# Patient Record
Sex: MALE | Race: WHITE | HISPANIC OR LATINO | URBAN - METROPOLITAN AREA
[De-identification: names, ages, dates, MRNs, and addresses within clinical notes are randomized per-mention and may not be internally consistent; named-entity substitution may affect disease eponyms.]

---

## 2018-11-25 ENCOUNTER — EMERGENCY (EMERGENCY)
Facility: HOSPITAL | Age: 53
LOS: 1 days | Discharge: HOME | End: 2018-11-25
Attending: EMERGENCY MEDICINE | Admitting: EMERGENCY MEDICINE
Payer: MEDICAID

## 2018-11-25 ENCOUNTER — TRANSCRIPTION ENCOUNTER (OUTPATIENT)
Age: 53
End: 2018-11-25

## 2018-11-25 VITALS
RESPIRATION RATE: 18 BRPM | TEMPERATURE: 99 F | HEART RATE: 90 BPM | OXYGEN SATURATION: 100 % | DIASTOLIC BLOOD PRESSURE: 95 MMHG | SYSTOLIC BLOOD PRESSURE: 156 MMHG

## 2018-11-25 VITALS
OXYGEN SATURATION: 96 % | RESPIRATION RATE: 16 BRPM | HEART RATE: 109 BPM | DIASTOLIC BLOOD PRESSURE: 81 MMHG | SYSTOLIC BLOOD PRESSURE: 151 MMHG

## 2018-11-25 PROCEDURE — 99283 EMERGENCY DEPT VISIT LOW MDM: CPT

## 2018-11-25 PROCEDURE — 71046 X-RAY EXAM CHEST 2 VIEWS: CPT | Mod: 26

## 2018-11-25 RX ORDER — ALBUTEROL 90 UG/1
1 AEROSOL, METERED ORAL ONCE
Qty: 0 | Refills: 0 | Status: COMPLETED | OUTPATIENT
Start: 2018-11-25 | End: 2018-11-25

## 2018-11-25 RX ORDER — ALBUTEROL 90 UG/1
2.5 AEROSOL, METERED ORAL ONCE
Qty: 0 | Refills: 0 | Status: COMPLETED | OUTPATIENT
Start: 2018-11-25 | End: 2018-11-25

## 2018-11-25 RX ADMIN — Medication 50 MILLIGRAM(S): at 11:49

## 2018-11-25 RX ADMIN — ALBUTEROL 1 PUFF(S): 90 AEROSOL, METERED ORAL at 10:58

## 2018-11-25 RX ADMIN — ALBUTEROL 2.5 MILLIGRAM(S): 90 AEROSOL, METERED ORAL at 10:58

## 2018-11-25 NOTE — ED ADULT TRIAGE NOTE - CHIEF COMPLAINT QUOTE
pt states for the past three days when he tried to nap or sleep he has a loud 'wheezing sound' when he sleeps which keeps him up. pt denies any SOB, denies any pain. just states there is a loud noise. pt states he was never diagnosed with asthma. pt states past three days he has also had a cough. no wheezing heard in triage. pt denies other symptoms

## 2018-11-25 NOTE — ED PROVIDER NOTE - OBJECTIVE STATEMENT
Patient is a 53M with no PMH p/w cough and wheeze for 3-4 days, states symptoms have made it difficult to sleep. Had similar symptoms earlier in the year and went to urgent care at that time and was told that symptoms were 'inflammatory'. Denies fevers, night sweats, chills, malaise. Non-smoker, no history of asthma. Has radiator heating at home, and works as [employer]  so states 'possible sick riders'. No recent travel. Denies rash, nausea, vomiting. No diarrhea, constipation.

## 2018-11-25 NOTE — ED PROVIDER NOTE - MEDICAL DECISION MAKING DETAILS
53M with cough, RUL wheezing, possibly URI vs PNA, vs allergic asthma. Will get CXR, neb and re-assess 53M with cough, RUL wheezing, possibly URI vs PNA, vs allergic asthma. Will get CXR, neb and re-assess    cabot: 53M with PMH of HTN who comes in with 3d of dry cough and wheeze.  No F/C.  No CP.  Uber .  No prior PE/DVT, LE edema, immob, cancers.  On exam, HDS, NAD, MMM, eyes clear, lungs with mild wheeze on R lung field, heart sounds normal, abd soft, NT, ND, no CVAT, LEs without edema, wwp, skin normal temperature and color, neuro: alert and oriented, no focal deficits. LIkely viral URI.  CXR neg for PNA.  Will discharge home with steroids and albuterol.

## 2018-11-25 NOTE — ED PROVIDER NOTE - ATTENDING CONTRIBUTION TO CARE
I, Jennifer Cabot, MD, have performed a history and physical exam of the patient and discussed their management with the resident. I reviewed the resident's note and agree with the documented findings and plan of care. My medical decision making and observations are found above.    cabot: 53M with PMH of HTN who comes in with 3d of dry cough and wheeze.  No F/C.  No CP.  Uber .  No prior PE/DVT, LE edema, immob, cancers.  On exam, HDS, NAD, MMM, eyes clear, lungs with mild wheeze on R lung field, heart sounds normal, abd soft, NT, ND, no CVAT, LEs without edema, wwp, skin normal temperature and color, neuro: alert and oriented, no focal deficits. LIkely viral URI.  CXR neg for PNA.  Will discharge home with steroids and albuterol.

## 2021-10-02 ENCOUNTER — EMERGENCY (EMERGENCY)
Facility: HOSPITAL | Age: 56
LOS: 1 days | Discharge: HOME | End: 2021-10-02
Attending: EMERGENCY MEDICINE | Admitting: EMERGENCY MEDICINE
Payer: MEDICAID

## 2021-10-02 VITALS
OXYGEN SATURATION: 100 % | DIASTOLIC BLOOD PRESSURE: 86 MMHG | HEART RATE: 65 BPM | TEMPERATURE: 98 F | RESPIRATION RATE: 18 BRPM | SYSTOLIC BLOOD PRESSURE: 147 MMHG

## 2021-10-02 VITALS
RESPIRATION RATE: 14 BRPM | HEART RATE: 61 BPM | OXYGEN SATURATION: 97 % | SYSTOLIC BLOOD PRESSURE: 143 MMHG | DIASTOLIC BLOOD PRESSURE: 87 MMHG

## 2021-10-02 LAB
APPEARANCE UR: CLEAR — SIGNIFICANT CHANGE UP
BILIRUB UR-MCNC: NEGATIVE — SIGNIFICANT CHANGE UP
COLOR SPEC: YELLOW — SIGNIFICANT CHANGE UP
DIFF PNL FLD: NEGATIVE — SIGNIFICANT CHANGE UP
GLUCOSE UR QL: ABNORMAL
KETONES UR-MCNC: NEGATIVE — SIGNIFICANT CHANGE UP
LEUKOCYTE ESTERASE UR-ACNC: NEGATIVE — SIGNIFICANT CHANGE UP
NITRITE UR-MCNC: NEGATIVE — SIGNIFICANT CHANGE UP
PH UR: 6 — SIGNIFICANT CHANGE UP (ref 5–8)
PROT UR-MCNC: NEGATIVE — SIGNIFICANT CHANGE UP
SP GR SPEC: 1.02 — SIGNIFICANT CHANGE UP (ref 1–1.05)
UROBILINOGEN FLD QL: SIGNIFICANT CHANGE UP

## 2021-10-02 PROCEDURE — 99284 EMERGENCY DEPT VISIT MOD MDM: CPT

## 2021-10-02 PROCEDURE — 71046 X-RAY EXAM CHEST 2 VIEWS: CPT | Mod: 26

## 2021-10-02 PROCEDURE — 76770 US EXAM ABDO BACK WALL COMP: CPT | Mod: 26

## 2021-10-02 NOTE — ED PROVIDER NOTE - PATIENT PORTAL LINK FT
You can access the FollowMyHealth Patient Portal offered by [facility] by registering at the following website: [email or web address]. By joining [employer]’s FollowMyHealth portal, you will also be able to view your health information using other applications (apps) compatible with our system.

## 2021-10-02 NOTE — ED ADULT NURSE NOTE - OBJECTIVE STATEMENT
Pt. is A&Ox4 ambulatory presents to ER c/o LLQ abdominal pain for 3 days. Denies N/V/D, fever, dysuria. hematuria, chills, SOB, cough, and chest pain. Denies PMH. Respirations even and unlabored, abdomen nontender and nondistended. Safety maintained.

## 2021-10-02 NOTE — ED PROVIDER NOTE - CARE PLAN
1 Principal Discharge DX:	Abdominal pain   Principal Discharge DX:	Abdominal pain  Assessment and plan of treatment:	You were seen today for your abdominal pain.  It is unclear what the cause is at this time.  Take acetaminophen for pain.  Avoid ibuprofen as it can irritate your stomach.  Take the prescribed pain medication for breakthrough pain.  Be sure to follow up with your primary care physician in 2-3 days for re-evaluation.  RETURN TO THE EMERGENCY DEPARTMENT IMMEDIATELY IF YOU DEVELOP SEVERE PAIN, CANNOT EAT OR DRINK, OR FOR ANY OTHER CONCERN.

## 2021-10-02 NOTE — ED PROVIDER NOTE - CHPI ED SYMPTOMS NEG
no change in appetite, no cough, no shortness of breath/no dysuria/no hematuria/no nausea/no vomiting

## 2021-10-02 NOTE — ED PROVIDER NOTE - CLINICAL SUMMARY MEDICAL DECISION MAKING FREE TEXT BOX
57 y/o M w/ LUQ pain at the costal margin. No mass to suggest splenomegaly. Consider lung pathology vs. gastritis vs. renal cyst. Will obtain chest x-ray, renal ultrasound, urinalysis. Pt declines pain medications. Anticipate discharge w/ PCP follow up.

## 2021-10-03 LAB
CULTURE RESULTS: SIGNIFICANT CHANGE UP
SPECIMEN SOURCE: SIGNIFICANT CHANGE UP

## 2024-04-11 NOTE — ED PROVIDER NOTE - NSFOLLOWUPINSTRUCTIONS_ED_ALL_ED_FT
[facility]  Metabolic Weight Loss Program        Patient's Name: Guerita Seaman  : 2000    This patient is a participant at [facility] and attended the weekly virtual nutrition class hosted via [employer].      Cheryl Sierra, MS, RD, LDN  
Please  prednisone prescription and take as directed, first dose on 11/26.  Please use albuterol puffer as needed for symptoms  Please follow up with your primary doctor in 24-48 hours.  If you develop worsening or new symptoms please return to ER or call your doctor.

## 2024-05-26 NOTE — ED PROVIDER NOTE - CPE EDP CARDIAC NORM
From: Frank Bolaños  To: Amari Moyer  Sent: 5/25/2024 7:57 PM CDT  Subject: sertraline 50 MG Tabs    Hi. Is there alcohol restrictions (social drinking) when taking this medication?   
normal...

## 2025-07-04 ENCOUNTER — EMERGENCY (EMERGENCY)
Facility: HOSPITAL | Age: 60
LOS: 1 days | End: 2025-07-04
Attending: EMERGENCY MEDICINE
Payer: MEDICAID

## 2025-07-04 VITALS
OXYGEN SATURATION: 95 % | HEART RATE: 70 BPM | DIASTOLIC BLOOD PRESSURE: 88 MMHG | WEIGHT: 210.1 LBS | SYSTOLIC BLOOD PRESSURE: 143 MMHG | HEIGHT: 67 IN | RESPIRATION RATE: 20 BRPM | TEMPERATURE: 98 F

## 2025-07-04 VITALS
SYSTOLIC BLOOD PRESSURE: 128 MMHG | TEMPERATURE: 98 F | RESPIRATION RATE: 16 BRPM | HEART RATE: 60 BPM | DIASTOLIC BLOOD PRESSURE: 78 MMHG | OXYGEN SATURATION: 96 %

## 2025-07-04 DIAGNOSIS — Z90.49 ACQUIRED ABSENCE OF OTHER SPECIFIED PARTS OF DIGESTIVE TRACT: Chronic | ICD-10-CM

## 2025-07-04 LAB
ALBUMIN SERPL ELPH-MCNC: 4.7 G/DL — SIGNIFICANT CHANGE UP (ref 3.3–5)
ALP SERPL-CCNC: 54 U/L — SIGNIFICANT CHANGE UP (ref 40–120)
ALT FLD-CCNC: 29 U/L — SIGNIFICANT CHANGE UP (ref 10–45)
ANION GAP SERPL CALC-SCNC: 16 MMOL/L — SIGNIFICANT CHANGE UP (ref 5–17)
APPEARANCE UR: CLEAR — SIGNIFICANT CHANGE UP
AST SERPL-CCNC: 17 U/L — SIGNIFICANT CHANGE UP (ref 10–40)
BASOPHILS # BLD AUTO: 0.06 K/UL — SIGNIFICANT CHANGE UP (ref 0–0.2)
BASOPHILS NFR BLD AUTO: 0.6 % — SIGNIFICANT CHANGE UP (ref 0–2)
BILIRUB SERPL-MCNC: 0.5 MG/DL — SIGNIFICANT CHANGE UP (ref 0.2–1.2)
BILIRUB UR-MCNC: NEGATIVE — SIGNIFICANT CHANGE UP
BUN SERPL-MCNC: 16 MG/DL — SIGNIFICANT CHANGE UP (ref 7–23)
CALCIUM SERPL-MCNC: 10.4 MG/DL — SIGNIFICANT CHANGE UP (ref 8.4–10.5)
CHLORIDE SERPL-SCNC: 101 MMOL/L — SIGNIFICANT CHANGE UP (ref 96–108)
CO2 SERPL-SCNC: 24 MMOL/L — SIGNIFICANT CHANGE UP (ref 22–31)
COLOR SPEC: YELLOW — SIGNIFICANT CHANGE UP
CREAT SERPL-MCNC: 1.01 MG/DL — SIGNIFICANT CHANGE UP (ref 0.5–1.3)
DIFF PNL FLD: NEGATIVE — SIGNIFICANT CHANGE UP
EGFR: 85 ML/MIN/1.73M2 — SIGNIFICANT CHANGE UP
EGFR: 85 ML/MIN/1.73M2 — SIGNIFICANT CHANGE UP
EOSINOPHIL # BLD AUTO: 0.15 K/UL — SIGNIFICANT CHANGE UP (ref 0–0.5)
EOSINOPHIL NFR BLD AUTO: 1.4 % — SIGNIFICANT CHANGE UP (ref 0–6)
GAS PNL BLDV: SIGNIFICANT CHANGE UP
GAS PNL BLDV: SIGNIFICANT CHANGE UP
GLUCOSE SERPL-MCNC: 94 MG/DL — SIGNIFICANT CHANGE UP (ref 70–99)
GLUCOSE UR QL: NEGATIVE MG/DL — SIGNIFICANT CHANGE UP
HCT VFR BLD CALC: 48.5 % — SIGNIFICANT CHANGE UP (ref 39–50)
HGB BLD-MCNC: 15.7 G/DL — SIGNIFICANT CHANGE UP (ref 13–17)
IMM GRANULOCYTES # BLD AUTO: 0.04 K/UL — SIGNIFICANT CHANGE UP (ref 0–0.07)
IMM GRANULOCYTES NFR BLD AUTO: 0.4 % — SIGNIFICANT CHANGE UP (ref 0–0.9)
KETONES UR QL: NEGATIVE MG/DL — SIGNIFICANT CHANGE UP
LEUKOCYTE ESTERASE UR-ACNC: NEGATIVE — SIGNIFICANT CHANGE UP
LIDOCAIN IGE QN: 51 U/L — SIGNIFICANT CHANGE UP (ref 7–60)
LYMPHOCYTES # BLD AUTO: 3.57 K/UL — HIGH (ref 1–3.3)
LYMPHOCYTES NFR BLD AUTO: 33.9 % — SIGNIFICANT CHANGE UP (ref 13–44)
MCHC RBC-ENTMCNC: 30.1 PG — SIGNIFICANT CHANGE UP (ref 27–34)
MCHC RBC-ENTMCNC: 32.4 G/DL — SIGNIFICANT CHANGE UP (ref 32–36)
MCV RBC AUTO: 93.1 FL — SIGNIFICANT CHANGE UP (ref 80–100)
MONOCYTES # BLD AUTO: 0.88 K/UL — SIGNIFICANT CHANGE UP (ref 0–0.9)
MONOCYTES NFR BLD AUTO: 8.4 % — SIGNIFICANT CHANGE UP (ref 2–14)
NEUTROPHILS # BLD AUTO: 5.83 K/UL — SIGNIFICANT CHANGE UP (ref 1.8–7.4)
NEUTROPHILS NFR BLD AUTO: 55.3 % — SIGNIFICANT CHANGE UP (ref 43–77)
NITRITE UR-MCNC: NEGATIVE — SIGNIFICANT CHANGE UP
NRBC # BLD AUTO: 0 K/UL — SIGNIFICANT CHANGE UP (ref 0–0)
NRBC # FLD: 0 K/UL — SIGNIFICANT CHANGE UP (ref 0–0)
NRBC BLD AUTO-RTO: 0 /100 WBCS — SIGNIFICANT CHANGE UP (ref 0–0)
PH UR: 7 — SIGNIFICANT CHANGE UP (ref 5–8)
PLATELET # BLD AUTO: 276 K/UL — SIGNIFICANT CHANGE UP (ref 150–400)
PMV BLD: 10.5 FL — SIGNIFICANT CHANGE UP (ref 7–13)
POTASSIUM SERPL-MCNC: 3.6 MMOL/L — SIGNIFICANT CHANGE UP (ref 3.5–5.3)
POTASSIUM SERPL-SCNC: 3.6 MMOL/L — SIGNIFICANT CHANGE UP (ref 3.5–5.3)
PROT SERPL-MCNC: 8.1 G/DL — SIGNIFICANT CHANGE UP (ref 6–8.3)
PROT UR-MCNC: NEGATIVE MG/DL — SIGNIFICANT CHANGE UP
RBC # BLD: 5.21 M/UL — SIGNIFICANT CHANGE UP (ref 4.2–5.8)
RBC # FLD: 12.8 % — SIGNIFICANT CHANGE UP (ref 10.3–14.5)
SODIUM SERPL-SCNC: 141 MMOL/L — SIGNIFICANT CHANGE UP (ref 135–145)
SP GR SPEC: 1.01 — SIGNIFICANT CHANGE UP (ref 1–1.03)
TROPONIN T, HIGH SENSITIVITY RESULT: <6 NG/L — SIGNIFICANT CHANGE UP (ref 0–51)
UROBILINOGEN FLD QL: 1 MG/DL — SIGNIFICANT CHANGE UP (ref 0.2–1)
WBC # BLD: 10.53 K/UL — HIGH (ref 3.8–10.5)
WBC # FLD AUTO: 10.53 K/UL — HIGH (ref 3.8–10.5)

## 2025-07-04 PROCEDURE — 84132 ASSAY OF SERUM POTASSIUM: CPT

## 2025-07-04 PROCEDURE — 80053 COMPREHEN METABOLIC PANEL: CPT

## 2025-07-04 PROCEDURE — 71045 X-RAY EXAM CHEST 1 VIEW: CPT

## 2025-07-04 PROCEDURE — 85018 HEMOGLOBIN: CPT

## 2025-07-04 PROCEDURE — 84295 ASSAY OF SERUM SODIUM: CPT

## 2025-07-04 PROCEDURE — 74177 CT ABD & PELVIS W/CONTRAST: CPT | Mod: 26

## 2025-07-04 PROCEDURE — 81003 URINALYSIS AUTO W/O SCOPE: CPT

## 2025-07-04 PROCEDURE — 96374 THER/PROPH/DIAG INJ IV PUSH: CPT | Mod: XU

## 2025-07-04 PROCEDURE — 99285 EMERGENCY DEPT VISIT HI MDM: CPT

## 2025-07-04 PROCEDURE — 85014 HEMATOCRIT: CPT

## 2025-07-04 PROCEDURE — 82803 BLOOD GASES ANY COMBINATION: CPT

## 2025-07-04 PROCEDURE — 36415 COLL VENOUS BLD VENIPUNCTURE: CPT

## 2025-07-04 PROCEDURE — 83690 ASSAY OF LIPASE: CPT

## 2025-07-04 PROCEDURE — 71045 X-RAY EXAM CHEST 1 VIEW: CPT | Mod: 26

## 2025-07-04 PROCEDURE — 74177 CT ABD & PELVIS W/CONTRAST: CPT

## 2025-07-04 PROCEDURE — 93005 ELECTROCARDIOGRAM TRACING: CPT

## 2025-07-04 PROCEDURE — 82947 ASSAY GLUCOSE BLOOD QUANT: CPT

## 2025-07-04 PROCEDURE — 82330 ASSAY OF CALCIUM: CPT

## 2025-07-04 PROCEDURE — 82435 ASSAY OF BLOOD CHLORIDE: CPT

## 2025-07-04 PROCEDURE — 93010 ELECTROCARDIOGRAM REPORT: CPT

## 2025-07-04 PROCEDURE — 83605 ASSAY OF LACTIC ACID: CPT

## 2025-07-04 PROCEDURE — 85025 COMPLETE CBC W/AUTO DIFF WBC: CPT

## 2025-07-04 PROCEDURE — 99285 EMERGENCY DEPT VISIT HI MDM: CPT | Mod: 25

## 2025-07-04 PROCEDURE — 84484 ASSAY OF TROPONIN QUANT: CPT

## 2025-07-04 RX ADMIN — Medication 1000 MILLILITER(S): at 18:43

## 2025-07-04 RX ADMIN — Medication 20 MILLIGRAM(S): at 18:43

## 2025-07-04 RX ADMIN — Medication 1000 MILLILITER(S): at 19:11

## 2025-07-04 NOTE — ED PROVIDER NOTE - OBJECTIVE STATEMENT
59 year old man PMH HTN, DM, psoriasis, cholecystectomy presenting with 2 days of abdominal pain. It started two days ago, is right mid sided under the ribs, denies n/v/d/c, dysuria, denies back pain, denies chest pain, sob, denies fevers, chills. pain is intermittent and stabbing. has been tolerating PO without incident, denies testicular pain

## 2025-07-04 NOTE — ED PROVIDER NOTE - PHYSICAL EXAMINATION
Physical Exam:  Gen: no acute distress, AOx3, nontoxic appearing  Head: NCAT  HEENT: EOMI, PEERLA, normal conjunctiva, tongue midline, oral mucosa moist  Lung: CTAB, no respiratory distress, no wheezes/rhonchi/rales B/L, speaking in full sentences  CV: RRR, no murmurs, rubs or gallops  Abd: soft, mild right sided abdominal tenderness, ND, no guarding, no rigidity, no rebound tenderness, no CVA tenderness  MSK: no visible deformities, ROM normal in UE/LE, no neck / back pain, calf tenderness  Neuro: No focal sensory or motor deficits in cranial nerves, upper and lower extremities  Skin: Warm, well perfused, no rash, no leg swelling

## 2025-07-04 NOTE — ED PROVIDER NOTE - PROGRESS NOTE DETAILS
Tashia PGY-2:  Patient was signed out to me pending rest of workup.  Here for abdominal pain, CT abdomen pelvis was negative, labs were mostly unremarkable except for a significant elevated lactate of 6.  Otherwise electrolytes look okay.  Concern for prior lab value however will give patient second liter of fluids and recheck a VBG.  Patient states pain has improved after Pepcid and feels well, is not toxic appearing, laying comfortably in bed. Tashia PGY-2:   Repeat VBG lactate well within normal limits of 1.2.  Patient pain-free now, stable for discharge with strict return precautions and follow-up instructions with PCP.

## 2025-07-04 NOTE — ED PROVIDER NOTE - CLINICAL SUMMARY MEDICAL DECISION MAKING FREE TEXT BOX
59 year old man PMH HTN, DM, psoriasis, cholecystectomy presenting with 2 days of abdominal pain, no n/v/d/c, no fevers or chills, no chest pain or SOB, vials WNL, exam notable for mild right sided abdominal tenderness, no CVA tenderness, concerning for choledocholithiasis vs UTI / pyelo vs nephrolithiasis vs pancaretitis vs gastritis vs ACS. cbc, cmp, trop, lipase, EKG, CXR, CTAP. 59 year old man PMH HTN, DM, psoriasis, cholecystectomy presenting with 2 days of abdominal pain, no n/v/d/c, no fevers or chills, no chest pain or SOB, vials WNL, exam notable for mild right sided abdominal tenderness, no CVA tenderness, concerning for choledocholithiasis vs UTI / pyelo vs nephrolithiasis vs pancaretitis vs gastritis vs ACS. cbc, cmp, trop, lipase, EKG, CXR, CTAP.Reassess ZR

## 2025-07-04 NOTE — ED ADULT NURSE NOTE - OBJECTIVE STATEMENT
59yM, A&Ox4, independent, PMH HTN, DM, psoriasis, cholecystectomy presents to the ED c/o abd pain x 2 days. Pain is RUQ, nonradiating, intermittent. Pain sharp, stabbing in nature. Gross neuro intact, no difficulty speaking in complete sentences, pulses x 4, moving all extremities, abdomen soft nondistended, skin intact. Pt denies fevers/chills, numbness/tingling, weakness, headache, dizziness, vision changes, cp, sob, cough, n/v/d, dysuria, hematuria, bloody stools, back pain.

## 2025-07-04 NOTE — ED PROVIDER NOTE - PATIENT PORTAL LINK FT
You can access the FollowMyHealth Patient Portal offered by [facility] by registering at the following website: [email or web address]. By joining [employer]’s FollowMyHealth portal, you will also be able to view your health information using other applications (apps) compatible with our system.
